# Patient Record
(demographics unavailable — no encounter records)

---

## 2025-02-27 NOTE — DISCUSSION/SUMMARY
[FreeTextEntry1] : Pt with bipolar illness.  Presents as psychiatrically stable on current meds. [Date of Last HbgA1c: _____] : Date of Last HbgA1c: [unfilled] [Date of Last Lipid Profile: _____] : Date of Last Lipid Profile: [unfilled]

## 2025-02-27 NOTE — PHYSICAL EXAM
[Average] : average [Cooperative] : cooperative [Euthymic] : euthymic [Full] : full [Clear] : clear [Linear/Goal Directed] : linear/goal directed [WNL] : within normal limits [3 - Mildly ill] : 3 - Mildly ill  (clearly established symptoms with minimal, if any, distress or difficulty in social and occupational function) [2 - Much improved] : 2 - Much improved  (notably better with significant reduction of symptoms; increase in the level of functioning but some symptoms remain)

## 2025-02-27 NOTE — SOCIAL HISTORY
[FreeTextEntry1] : From previous notes:  Domiciled at home w parents (mom, homemaker. Dad, teacher) Sisters: Jolene age 23, Chaya age 18. IEP in middle school for "learning disability" unknown. [employer] grade 4-5. 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 Tutored drums, now taking drum class (3/'18). [place] family - House on the [address]. Plays in [employer] band w family.    no known substance use

## 2025-02-27 NOTE — PLAN
[Medication education provided] : Medication education provided. [Rationale for medication choices, possible risks/precautions, benefits, alternative treatment choices, and consequences of non-treatment discussed] : Rationale for medication choices, possible risks/precautions, benefits, alternative treatment choices, and consequences of non-treatment discussed with patient/family/caregiver  [FreeTextEntry4] : Medication management to prevent recurrence of manic episodes. [FreeTextEntry5] : Continue Depakote 1000mg daily Continue Zyprexa 5mg qhs RTC 3 months

## 2025-02-27 NOTE — REASON FOR VISIT
[Home] : at home, [unfilled] , at the time of the visit. [Other Location: e.g. Home (Enter Location, City,State)___] : at [unfilled] [Mother] : mother [Patient] : the patient [FreeTextEntry1] : Medication management

## 2025-02-27 NOTE — HISTORY OF PRESENT ILLNESS
[FreeTextEntry1] : Pt seen in follow-up, along with her mother.  Both again report continued stability from a psychiatric perspective.  Mood has been even.  No significant perturbations in either direction.  She still has of fatigue during the day and often feels the need to nap. She had new labs done, and mother showed some of them to me on her phone.  TSH went down to 0.88 with treatment.  A1c was normal at 5.3%.  No significant issues on Chemistry panel.  Mother agreed to print the full labs off and bring a copy to the office to be uploaded.  Mother brought up the issue of pt's 30th birthday coming in 6 months, and the insurance implications.  Mother is exploring possibility of a disability application, but it seems unlikely to me that she would be awarded it.  I suggested looking into insurance exchanges and also the possibility of looking for a job that provides benefits. Pt fears the fatigue will be a problem.  She agreed to start getting more physically active as the weather warms, to see if she could build up more strength and energy.   [FreeTextEntry2] : Jan 2022; manic episode not requiring hospitalization, stabilized on Depakote ER 1000mg po qhs, Zyprexa 5mg IPP @ [facility] 7/31/21-8/6/21 for manic episode after abruptly d/c lithium on 7/10, noted to be hyper, irritable, physically aggressive w/ family, no evidence of psychotic sxs, pt was d/c on Abilify 5mg Depakote 250mg/750mg from [facility], however pt c/o nausea/vomiting/abdominal pain, decreased Depakote to ER 500mg with improvement of GI sxs, and increased Abilify to 15mg, however after approx. 2 weeks pt developed akathisia, and Abilify discontinued, switched to latuda but also c/o akathisia which did not improve w/ propranolol   Jan-March 2018; hypomanic episode not requiring hospitalization, started on abilify and lithium.    IPP @ [facility] 4/4/16 - 4/14/16 for psychosis (per records disorganized Tp, internal preoccupation, hyper-religiosity, poor sleep/po intake, odd behavior (pray loudly 3am, encopresis) in context of 1.5 yrs of decompensation since dropping out of college.  Description episode sxs: "confused,' with racing thoughts, increased energy, believing "the world was imaginary," increased lability of emotions (crying and laughing more intensely and frequently), decreased need for sleep and "at one point I believed I was an josafat of god?[and later]? I believed I was god."    [FreeTextEntry3] : abilify, latuda: akithesia \par  lithium: acne, weight gain

## 2025-07-01 NOTE — PLAN
Patient called and stated she was returning WILY Carpio's call. She asked for a call back.    [Medication education provided] : Medication education provided. [Rationale for medication choices, possible risks/precautions, benefits, alternative treatment choices, and consequences of non-treatment discussed] : Rationale for medication choices, possible risks/precautions, benefits, alternative treatment choices, and consequences of non-treatment discussed with patient/family/caregiver  [FreeTextEntry4] : Medication management to prevent recurrence of manic episodes. [FreeTextEntry5] : Continue Depakote 1000mg daily Continue Zyprexa 5mg qhs Consider Lybalvi RTC 3 months

## 2025-07-01 NOTE — SOCIAL HISTORY
[FreeTextEntry1] : From previous notes:  Domiciled at home w parents (mom, homemaker. Dad, teacher) Sisters: Jolene age 23, Chaya age 18. IEP in middle school for "learning disability" unknown. [employer] grade 4-5. 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 Tutored drums, now taking drum class (3/'18). [employer] family - [address]. Plays in [employer] band w family.    no known substance use

## 2025-07-01 NOTE — HISTORY OF PRESENT ILLNESS
[FreeTextEntry1] : Pt seen in follow-up.  Her mother is not with her today, though pt had notes from the mother to go over with me.  Pt says she has continued to feel well and stable overall.  No significant mood sx.  She continues to struggle with weight and fatigue, however.  She did have labs dome a couple of months ago at the PCP.  No meds were advised, but diet and exercise changes were suggested. Pt agreed to get me a copy of the labs.  She was to see her endocrinologist yesterday but had to cancel.  She will call to reschedule.  Pt has declined all previous suggestions for medication to perhaps help with weight and appetite.  Today, we discussed the potential option of Lybalvi.  Not certain that her insurer will cover, but she will discuss with her mother.  Treatment plan review was done.  Pt also was admonished for letting 4 months go between appointments.  She understands now that gap between appointments should not exceed 3 months. [FreeTextEntry2] : Jan 2022; manic episode not requiring hospitalization, stabilized on Depakote ER 1000mg po qhs, Zyprexa 5mg IPP @ [facility] 7/31/21-8/6/21 for manic episode after abruptly d/c lithium on 7/10, noted to be hyper, irritable, physically aggressive w/ family, no evidence of psychotic sxs, pt was d/c on Abilify 5mg Depakote 250mg/750mg from [facility], however pt c/o nausea/vomiting/abdominal pain, decreased Depakote to ER 500mg with improvement of GI sxs, and increased Abilify to 15mg, however after approx. 2 weeks pt developed akathisia, and Abilify discontinued, switched to latuda but also c/o akathisia which did not improve w/ propranolol   Jan-March 2018; hypomanic episode not requiring hospitalization, started on abilify and lithium.    IPP @ [facility] 4/4/16 - 4/14/16 for psychosis (per records disorganized Tp, internal preoccupation, hyper-religiosity, poor sleep/po intake, odd behavior (pray loudly 3am, encopresis) in context of 1.5 yrs of decompensation since dropping out of college.  Description episode sxs: "confused,' with racing thoughts, increased energy, believing "the world was imaginary," increased lability of emotions (crying and laughing more intensely and frequently), decreased need for sleep and "at one point I believed I was an josafat of god?[and later]? I believed I was god."    [FreeTextEntry3] : abilify, latuda: akithesia \par  lithium: acne, weight gain

## 2025-07-01 NOTE — SOCIAL HISTORY
[FreeTextEntry1] : From previous notes:  Domiciled at home w parents (mom, homemaker. Dad, teacher) Sisters: Jolene age 23, Chaya age 18. IEP in middle school for "learning disability" unknown. [employer] grade 4-5. 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 Tutored drums, now taking drum class (3/'18). [employer] family - House on [address]. 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    no known substance use

## 2025-07-01 NOTE — RISK ASSESSMENT
[FreeTextEntry9] : No indication of any danger to self or others. [No, patient denies ideation or behavior] : No, patient denies ideation or behavior

## 2025-07-01 NOTE — DISCUSSION/SUMMARY
[Date of Last HbgA1c: _____] : Date of Last HbgA1c: [unfilled] [Date of Last Lipid Profile: _____] : Date of Last Lipid Profile: [unfilled] [Plan Review] : Plan Review [Able to manage surrounding demands and opportunities] : able to manage surrounding demands and opportunities [Adherent to treatment recommendations] : adherent to treatment recommendations [Articulate] : articulate [Cognitively intact] : cognitively intact [Motivated to participate in treatment] : motivated to participate in treatment [Part of a supportive family] : part of a supportive family [Housing stability] : housing stability [Connected to healthcare] : connected to healthcare [FreeTextEntry2] : 7/1/26 [FreeTextEntry5] : To stay well and stable and manage medication side effects. [FreeTextEntry8] : No specific barriers seen at this time. [Mental Health] : Mental Health [Continued - Progress made] : Continued - Progress made: [every ___ months] : every [unfilled] months [Treatment is no longer medically necessary as evidenced by:] : Treatment is no longer medically necessary as evidenced by: [Yes - Participants:] : Yes - Participants: [Yes] : Yes [Psychiatric Provider/Prescriber] : Psychiatric Provider/Prescriber [FreeTextEntry1] : Bipolar illness [FreeTextEntry4] : Pt has been quite stable on current meds and wants to remain so. [de-identified] : Pt has been stable through period but has not made much progress regarding weight. [de-identified] : Pt will not experience any significant mood perturbations in either direction. [de-identified] : 7/1/26 [de-identified] : Pt will consider medication options is unable to lose weight with lifestyle changes. [de-identified] : 7/1/26 [de-identified] : MSE and pt report [de-identified] : Successful taper off of all medication (not currently part of treatment plan) [de-identified] : Pt's mother is present at all appointments and actively involved in decision making.

## 2025-07-01 NOTE — PLAN
[Medication education provided] : Medication education provided. [Rationale for medication choices, possible risks/precautions, benefits, alternative treatment choices, and consequences of non-treatment discussed] : Rationale for medication choices, possible risks/precautions, benefits, alternative treatment choices, and consequences of non-treatment discussed with patient/family/caregiver  [FreeTextEntry4] : Medication management to prevent recurrence of manic episodes. [FreeTextEntry5] : Continue Depakote 1000mg daily Continue Zyprexa 5mg qhs Consider Lybalvi RTC 3 months

## 2025-07-01 NOTE — HISTORY OF PRESENT ILLNESS
[FreeTextEntry1] : Pt seen in follow-up.  Her mother is not with her today, though pt had notes from the mother to go over with me.  Pt says she has continued to feel well and stable overall.  No significant mood sx.  She continues to struggle with weight and fatigue, however.  She did have labs dome a couple of months ago at the PCP.  No meds were advised, but diet and exercise changes were suggested. Pt agreed to get me a copy of the labs.  She was to see her endocrinologist yesterday but had to cancel.  She will call to reschedule.  Pt has declined all previous suggestions for medication to perhaps help with weight and appetite.  Today, we discussed the potential option of Lybalvi.  Not certain that her insurer will cover, but she will discuss with her mother.  Treatment plan review was done.  Pt also was admonished for letting 4 months go between appointments.  She understands now that gap between appointments should not exceed 3 months. [FreeTextEntry2] : Jan 2022; manic episode not requiring hospitalization, stabilized on Depakote ER 1000mg po qhs, Zyprexa 5mg IPP @ [facility] 7/31/21-8/6/21 for manic episode after abruptly d/c lithium on 7/10, noted to be hyper, irritable, physically aggressive w/ family, no evidence of psychotic sxs, pt was d/c on Abilify 5mg Depakote 250mg/750mg from [facility], however pt c/o nausea/vomiting/abdominal pain, decreased Depakote to ER 500mg with improvement of GI sxs, and increased Abilify to 15mg, however after approx. 2 weeks pt developed akathisia, and Abilify discontinued, switched to latuda but also c/o akathisia which did not improve w/ propranolol   Jan-March 2018; hypomanic episode not requiring hospitalization, started on abilify and lithium.    IPP @ [facility] 4/4/16 - 4/14/16 for psychosis (per records disorganized Tp, internal preoccupation, hyper-religiosity, poor sleep/po intake, odd behavior (pray loudly 3am, encopresis) in context of 1.5 yrs of decompensation since dropping out of college.  Description episode sxs: "confused,' with racing thoughts, increased energy, believing "the world was imaginary," increased lability of emotions (crying and laughing more intensely and frequently), decreased need for sleep and "at one point I believed I was an josafat of god?[and later]? I believed I was god."    [FreeTextEntry3] : abilify, latuda: akithesia \par  lithium: acne, weight gain